# Patient Record
Sex: MALE | Race: BLACK OR AFRICAN AMERICAN | Employment: FULL TIME
[De-identification: names, ages, dates, MRNs, and addresses within clinical notes are randomized per-mention and may not be internally consistent; named-entity substitution may affect disease eponyms.]

---

## 2019-09-27 ENCOUNTER — HEALTH MAINTENANCE LETTER (OUTPATIENT)
Age: 44
End: 2019-09-27

## 2019-10-26 ENCOUNTER — HEALTH MAINTENANCE LETTER (OUTPATIENT)
Age: 44
End: 2019-10-26

## 2021-01-09 ENCOUNTER — HEALTH MAINTENANCE LETTER (OUTPATIENT)
Age: 46
End: 2021-01-09

## 2021-10-23 ENCOUNTER — HEALTH MAINTENANCE LETTER (OUTPATIENT)
Age: 46
End: 2021-10-23

## 2022-02-12 ENCOUNTER — HEALTH MAINTENANCE LETTER (OUTPATIENT)
Age: 47
End: 2022-02-12

## 2022-10-09 ENCOUNTER — HEALTH MAINTENANCE LETTER (OUTPATIENT)
Age: 47
End: 2022-10-09

## 2023-03-25 ENCOUNTER — HEALTH MAINTENANCE LETTER (OUTPATIENT)
Age: 48
End: 2023-03-25

## 2025-07-21 ENCOUNTER — OFFICE VISIT (OUTPATIENT)
Dept: FAMILY MEDICINE | Facility: CLINIC | Age: 50
End: 2025-07-21
Payer: MEDICAID

## 2025-07-21 VITALS
DIASTOLIC BLOOD PRESSURE: 104 MMHG | OXYGEN SATURATION: 96 % | HEIGHT: 74 IN | TEMPERATURE: 97.5 F | BODY MASS INDEX: 40.43 KG/M2 | WEIGHT: 315 LBS | RESPIRATION RATE: 18 BRPM | SYSTOLIC BLOOD PRESSURE: 144 MMHG | HEART RATE: 73 BPM

## 2025-07-21 DIAGNOSIS — E89.6 H/O TOTAL ADRENALECTOMY: ICD-10-CM

## 2025-07-21 DIAGNOSIS — Z78.9 MEDICALLY COMPLEX PATIENT: ICD-10-CM

## 2025-07-21 DIAGNOSIS — E78.5 HYPERLIPIDEMIA WITH TARGET LDL LESS THAN 100: ICD-10-CM

## 2025-07-21 DIAGNOSIS — E26.9 ALDOSTERONISM: ICD-10-CM

## 2025-07-21 DIAGNOSIS — Z86.79 HISTORY OF CORONARY ARTERY DISEASE: ICD-10-CM

## 2025-07-21 DIAGNOSIS — Z90.3 H/O GASTRIC SLEEVE: ICD-10-CM

## 2025-07-21 DIAGNOSIS — Z13.1 SCREENING FOR DIABETES MELLITUS: ICD-10-CM

## 2025-07-21 DIAGNOSIS — I10 PRIMARY HYPERTENSION: ICD-10-CM

## 2025-07-21 DIAGNOSIS — Z11.59 NEED FOR HEPATITIS C SCREENING TEST: ICD-10-CM

## 2025-07-21 DIAGNOSIS — Z11.4 SCREENING FOR HIV (HUMAN IMMUNODEFICIENCY VIRUS): ICD-10-CM

## 2025-07-21 DIAGNOSIS — N18.31 STAGE 3A CHRONIC KIDNEY DISEASE (H): ICD-10-CM

## 2025-07-21 DIAGNOSIS — I25.10 CORONARY ARTERY DISEASE DUE TO LIPID RICH PLAQUE: ICD-10-CM

## 2025-07-21 DIAGNOSIS — Z00.00 ANNUAL WELLNESS VISIT: ICD-10-CM

## 2025-07-21 DIAGNOSIS — Z12.11 SCREEN FOR COLON CANCER: ICD-10-CM

## 2025-07-21 DIAGNOSIS — Z13.6 SCREENING FOR CARDIOVASCULAR CONDITION: ICD-10-CM

## 2025-07-21 DIAGNOSIS — Z76.89 ENCOUNTER TO ESTABLISH CARE: Primary | ICD-10-CM

## 2025-07-21 DIAGNOSIS — E66.813 CLASS 3 SEVERE OBESITY WITH SERIOUS COMORBIDITY AND BODY MASS INDEX (BMI) OF 40.0 TO 44.9 IN ADULT, UNSPECIFIED OBESITY TYPE (H): ICD-10-CM

## 2025-07-21 DIAGNOSIS — I25.83 CORONARY ARTERY DISEASE DUE TO LIPID RICH PLAQUE: ICD-10-CM

## 2025-07-21 LAB
EST. AVERAGE GLUCOSE BLD GHB EST-MCNC: 117 MG/DL
HBA1C MFR BLD: 5.7 % (ref 0–5.6)

## 2025-07-21 PROCEDURE — 99214 OFFICE O/P EST MOD 30 MIN: CPT | Mod: 25 | Performed by: INTERNAL MEDICINE

## 2025-07-21 PROCEDURE — 36415 COLL VENOUS BLD VENIPUNCTURE: CPT | Performed by: INTERNAL MEDICINE

## 2025-07-21 PROCEDURE — 99000 SPECIMEN HANDLING OFFICE-LAB: CPT | Performed by: INTERNAL MEDICINE

## 2025-07-21 PROCEDURE — 83036 HEMOGLOBIN GLYCOSYLATED A1C: CPT | Performed by: INTERNAL MEDICINE

## 2025-07-21 PROCEDURE — 3080F DIAST BP >= 90 MM HG: CPT | Performed by: INTERNAL MEDICINE

## 2025-07-21 PROCEDURE — 3044F HG A1C LEVEL LT 7.0%: CPT | Performed by: INTERNAL MEDICINE

## 2025-07-21 PROCEDURE — 93000 ELECTROCARDIOGRAM COMPLETE: CPT | Performed by: INTERNAL MEDICINE

## 2025-07-21 PROCEDURE — 3050F LDL-C >= 130 MG/DL: CPT | Performed by: INTERNAL MEDICINE

## 2025-07-21 PROCEDURE — 80061 LIPID PANEL: CPT | Performed by: INTERNAL MEDICINE

## 2025-07-21 PROCEDURE — 86803 HEPATITIS C AB TEST: CPT | Performed by: INTERNAL MEDICINE

## 2025-07-21 PROCEDURE — 82088 ASSAY OF ALDOSTERONE: CPT | Performed by: INTERNAL MEDICINE

## 2025-07-21 PROCEDURE — 84244 ASSAY OF RENIN: CPT | Mod: 90 | Performed by: INTERNAL MEDICINE

## 2025-07-21 PROCEDURE — 99386 PREV VISIT NEW AGE 40-64: CPT | Performed by: INTERNAL MEDICINE

## 2025-07-21 PROCEDURE — 87389 HIV-1 AG W/HIV-1&-2 AB AG IA: CPT | Performed by: INTERNAL MEDICINE

## 2025-07-21 PROCEDURE — 82570 ASSAY OF URINE CREATININE: CPT | Performed by: INTERNAL MEDICINE

## 2025-07-21 PROCEDURE — 82043 UR ALBUMIN QUANTITATIVE: CPT | Performed by: INTERNAL MEDICINE

## 2025-07-21 PROCEDURE — 3075F SYST BP GE 130 - 139MM HG: CPT | Performed by: INTERNAL MEDICINE

## 2025-07-21 RX ORDER — METOPROLOL SUCCINATE 25 MG/1
50 TABLET, EXTENDED RELEASE ORAL DAILY
Qty: 180 TABLET | Refills: 0 | Status: SHIPPED | OUTPATIENT
Start: 2025-07-21

## 2025-07-21 RX ORDER — FELODIPINE 10 MG/1
10 TABLET, EXTENDED RELEASE ORAL DAILY
COMMUNITY
End: 2025-07-21

## 2025-07-21 RX ORDER — ROSUVASTATIN CALCIUM 40 MG/1
40 TABLET, COATED ORAL DAILY
Qty: 90 TABLET | Refills: 1 | Status: SHIPPED | OUTPATIENT
Start: 2025-07-21

## 2025-07-21 RX ORDER — FELODIPINE 10 MG/1
10 TABLET, EXTENDED RELEASE ORAL DAILY
Qty: 90 TABLET | Refills: 0 | Status: SHIPPED | OUTPATIENT
Start: 2025-07-21

## 2025-07-21 RX ORDER — METOPROLOL TARTRATE 75 MG/1
TABLET ORAL
COMMUNITY

## 2025-07-21 RX ORDER — HYDROCHLOROTHIAZIDE 12.5 MG/1
12.5 TABLET ORAL DAILY
Qty: 90 TABLET | Refills: 0 | Status: SHIPPED | OUTPATIENT
Start: 2025-07-21

## 2025-07-21 RX ORDER — METOPROLOL SUCCINATE 25 MG/1
25 TABLET, EXTENDED RELEASE ORAL 3 TIMES DAILY
COMMUNITY
End: 2025-07-21

## 2025-07-21 RX ORDER — LOSARTAN POTASSIUM 25 MG/1
25 TABLET ORAL DAILY
Qty: 90 TABLET | Refills: 0 | Status: SHIPPED | OUTPATIENT
Start: 2025-07-21

## 2025-07-21 SDOH — HEALTH STABILITY: PHYSICAL HEALTH: ON AVERAGE, HOW MANY DAYS PER WEEK DO YOU ENGAGE IN MODERATE TO STRENUOUS EXERCISE (LIKE A BRISK WALK)?: 2 DAYS

## 2025-07-21 ASSESSMENT — SOCIAL DETERMINANTS OF HEALTH (SDOH): HOW OFTEN DO YOU GET TOGETHER WITH FRIENDS OR RELATIVES?: ONCE A WEEK

## 2025-07-21 NOTE — PROGRESS NOTES
Preventive Care Visit  [facility]  Ward Kumar MD, Internal Medicine  Jul 21, 2025      Assessment & Plan   Problem List Items Addressed This Visit       CAD (coronary artery disease)    Relevant Orders    EKG 12-lead complete w/read - Clinics (Completed)    CKD (chronic kidney disease) stage 3, GFR 30-59 ml/min (H)    Relevant Orders    Albumin Random Urine Quantitative with Creat Ratio (Completed)    Adult Nephrology  Referral    Hyperlipidemia with target LDL less than 100    Relevant Medications    rosuvastatin (CRESTOR) 40 MG tablet    HTN (hypertension)    Relevant Medications    felodipine ER (PLENDIL) 10 MG 24 hr tablet    metoprolol succinate ER (TOPROL XL) 25 MG 24 hr tablet    hydroCHLOROthiazide 12.5 MG tablet    losartan (COZAAR) 25 MG tablet    Other Relevant Orders    Albumin Random Urine Quantitative with Creat Ratio (Completed)    Adult Cardiology Eval  Referral    Hyperaldosteronism     Other Visit Diagnoses         Encounter to establish care    -  Primary      Annual wellness visit          Screening for cardiovascular condition          Screening for diabetes mellitus        Relevant Orders    Hemoglobin A1c (Completed)      Screen for colon cancer        Relevant Orders    Colonoscopy Screening  Referral      Screening for HIV (human immunodeficiency virus)        Relevant Orders    HIV Antigen Antibody Combo (Completed)      Need for hepatitis C screening test        Relevant Orders    Hepatitis C Screen Reflex to HCV RNA Quant and Genotype (Completed)      History of coronary artery disease        Relevant Medications    metoprolol succinate ER (TOPROL XL) 25 MG 24 hr tablet    Other Relevant Orders    Lipid panel reflex to direct LDL Non-fasting (Completed)    Echocardiogram Complete      H/O total adrenalectomy        Relevant Orders    Aldosterone    Renin activity    Aldosterone Renin Ratio      H/O gastric sleeve          Class 3 severe  obesity with serious comorbidity and body mass index (BMI) of 40.0 to 44.9 in adult, unspecified obesity type (H)        Relevant Orders    Adult Comprehensive Weight Management  Referral      Medically complex patient        Relevant Orders    Med Therapy Management Referral           Assessment & Plan  Encounter to establish care:  - Establish care with a heart specialist and a kidney specialist.    Screening for cardiovascular condition:  - Coronary artery disease due to lipid-rich plaque.  - Order an echocardiogram to assess heart function.    Hyperlipidemia with target LDL less than 100:  - Continue Crestor for cholesterol management.    Screen for colon cancer:  - Order a colonoscopy.    Screening for HIV (human immunodeficiency virus):  - Conduct HIV screening as recommended by CDC.    Need for hepatitis C screening test:  - Conduct hepatitis C screening as recommended by CDC.    Aldosteronism:  - History of adrenalectomy for hyperaldosteronism.  - Check aldosterone levels.    Primary hypertension:  - Blood pressure is elevated.  - Prescribe metoprolol, hydrochlorothiazide, losartan, and felodipine. Monitor blood pressure and heart rate. Adjust metoprolol dosage based on heart rate.  - Risks and side effects: Monitor for dizziness or lightheadedness with metoprolol.    Coronary artery disease due to lipid rich plaque:  - History of coronary artery disease with stent placement.  - Continue Crestor. Establish care with a heart specialist.    H/O total adrenalectomy:  - History of right adrenalectomy due to hyperaldosteronism.    Stage 3a chronic kidney disease:  - GFR of 45, indicating stage 3a chronic kidney disease.  - Monitor kidney function with labs. Prescribe low-dose losartan.    H/O gastric sleeve:  - Consider referral to weight management.    Class 3 severe obesity with serious comorbidity and body mass index (BMI) of 40.0 to 44.9 in adult, unspecified obesity type:  - Consider referral to  weight management.    Medically complex patient:  - Coordinate care with specialists and pharmacist.     Assessment & Plan  Encounter to establish care:  Establish care with heart specialists and kidney specialists. Referral to pharmacist for medication management.    Screening for cardiovascular condition:  Coronary artery disease due to lipid-rich plaque.  Referral for echocardiogram and follow-up with heart specialist. Monitor heart rate and blood pressure regularly.    Hyperlipidemia with target LDL less than 100:  History of coronary artery disease.  Continue Crestor for cholesterol management to prevent plaque buildup.    Screen for colon cancer:  Order for colonoscopy after age 45.    Screening for HIV (human immunodeficiency virus):  Consent given for HIV screening.    Need for hepatitis C screening test:  Consent given for hepatitis C screening.    Aldosteronism:  History of adrenalectomy due to hyperaldosteronism.  Check aldosterone levels. Monitor kidney function.    Primary hypertension:  Long-standing hypertension since 2002.  Prescribe metoprolol, hydrochlorothiazide, losartan, and felodipine. Monitor heart rate and blood pressure. Adjust medication based on heart rate readings.  Risks and side effects: metoprolol can cause dizziness and low heart rate if taken in excess.    Coronary artery disease due to lipid rich plaque:  History of coronary artery disease with stent placement.  Referral to heart specialist for echocardiogram and cardiac evaluation.    H/O total adrenalectomy:  History of right adrenalectomy due to hyperaldosteronism.  Monitor aldosterone levels and kidney function.    Stage 3a chronic kidney disease (H):  Early stage chronic kidney disease with GFR of 45.  Monitor kidney function through labs. Prescribe low dose losartan to protect kidneys.    H/O gastric sleeve:  History of gastric sleeve surgery in 2018.  Referral to weight management program.    Class 3 severe obesity with  "serious comorbidity and body mass index (BMI) of 40.0 to 44.9 in adult, unspecified obesity type (H):  Referral to weight management program.     Patient has been advised of split billing requirements and indicates understanding: Yes    BMI  Estimated body mass index is 40.59 kg/m  as calculated from the following:    Height as of this encounter: 1.887 m (6' 2.29\").    Weight as of this encounter: 144.5 kg (318 lb 9.6 oz).   Weight management plan: Discussed healthy diet and exercise guidelines    Counseling  Appropriate preventive services were addressed with this patient via screening, questionnaire, or discussion as appropriate for fall prevention, nutrition, physical activity, Tobacco-use cessation, social engagement, weight loss and cognition.  Checklist reviewing preventive services available has been given to the patient.  Reviewed patient's diet, addressing concerns and/or questions.   He is at risk for lack of exercise and has been provided with information to increase physical activity for the benefit of his well-being.   The patient was instructed to see the dentist every 6 months.   Reviewed preventive health counseling, as reflected in patient instructions       Regular exercise       Healthy diet/nutrition       Vision screening       Hearing screening       Immunizations  Appropriate vaccinations were ordered.           Alcohol Use        Family planning       Safe sex practices/STD prevention       Hep C screening for all patients one time for ages 18-79 years       HIV screeninx in teen years, 1x in adult years, and at intervals if high risk       Colorectal cancer screening       Consider prostate cancer screening (age 55-69)       Consider lung cancer screening for ages 55-80 years (77 for Medicare) and 20 pack-year smoking history        Advance Care Planning        Pati Fuentes is a 49 year old, presenting for the following:  Physical and Establish Care            2025     1:59 " "PM   Additional Questions   Roomed by BLAYNE Cruz   Accompanied by self          HPI  Samuelsvetlana CHRISTOFER Delvalle, 49 years    Hypertension  History of high blood pressure since 2002. Previously managed with metoprolol and felodipine (referred to as \"flamlodipine\" by patient). Patient reports running out of metoprolol after moving from [place] at the beginning of June 2025 and has not taken it for approximately 2-3 months. No current use of other blood pressure medications since the move. No reported chest pain or symptoms with exertion. No reported swelling in legs except when sitting or standing for a long time. No tingling or numbness in arms or legs. No musculoskeletal deformities or joint pain reported.    Coronary Artery Disease  History of heart disease with stent placement in the \" maker\" artery, year not specified but referenced as \"202\". No repeat stents since initial procedure. No chest pain with exertion reported. Patient previously took baby aspirin and Crestor for cholesterol management.    Chronic Kidney Disease  Patient reports being told of kidney issues, attributed to long-term medication use. GFR previously noted as 45. No history of gout flares recently; past flares occurred in hand, ankle, and toe. No current use of hydrochlorothiazide or losartan prior to this visit.    Adrenal Disease/Hyperaldosteronism  History of hyperaldosteronism with right adrenalectomy in 2012 due to a hyperfunctioning adrenal tumor. Left adrenal gland noted to have nodules but was not removed. CT abdomen performed in 2011 showed mild left hydronephrosis (4 mm) due to a lower left kidney stone, stable mild contour deformity of pancreatic tail, and interval right adrenalectomy since 2012. No history of cancer associated with adrenal tumor.    Obesity/Weight History  History of gastric sleeve surgery in 2018. Initial weight loss reported, but subsequent weight gain during COVID-19 pandemic; currently 18 lbs above target " "weight.    Family History of Cancer and Cardiometabolic Disease  Mother had breast cancer two years ago, now has recurrent cancer with fluid in lungs described as cancerous; maternal grandmother and great-grandmother also  of breast cancer in the 1970s. Sister diagnosed with breast cancer two weeks prior to visit. Father  from stroke. One brother and one sister, both have type 2 diabetes; brother does not have hypertension per patient report.    Medication Allergies  History of hiccups with lisinopril use.    Miscellaneous  No sleep apnea reported; described as a quiet sleeper without snoring or waking up fatigued. Vision requires readers; recent hearing test was above average for age, no ringing in ears reported. No concerning moles or skin lesions noted by patient; presence of skin tags and small raised bumps around eyes for a long time. No scrotal masses or hernias reported. No depression or anxiety symptoms reported by patient. Has three daughters aged 31, 27, and 14 years old.    History of Present Illness-  Gina Delvalle, 49 years    Hypertension  - History of high blood pressure since   - Previously taking metoprolol and felodipine (referred to as \"flamlodipine\")  - Has not taken metoprolol or other blood pressure medications since moving in early 2025  - Previously took metoprolol 75 mg once daily (three 25 mg tablets at once), found higher doses intolerable  - Blood pressure medications last taken in [place] prior to 2025  - No reported chest pain or symptoms with exertion  - No reported swelling in legs except after prolonged sitting or standing  - No tingling or numbness in arms or legs    Coronary artery disease  - History of heart disease with stent placement in the \" maker\" artery in   - No repeat stents since initial procedure  - No chest pain with exertion reported  - Not currently taking metoprolol due to running out of medication after move in 2025  - Not " "currently taking Crestor but has taken it previously for cholesterol management  - Takes baby aspirin    Chronic kidney disease  - Told has kidney issues, attributed to long-term medication use  - GFR previously reported as 45  - Previously followed by a kidney doctor in [place]    Gout  - History of gout, with past flares affecting hand, ankle, and toe  - No recent flares reported    Hyperaldosteronism/adrenal tumor history  - History of hyperaldosteronism with right adrenalectomy performed in  for adrenal tumor (hyperfunctioning, not cancer per patient)  - Left adrenal gland has nodules but was not removed; imaging performed in  showed mild left hydronephrosis and small left kidney stone    Obesity/weight management  - Underwent gastric sleeve surgery in 2018 for weight loss  - Reports regaining weight after COVID pandemic, currently 18 lbs overweight compared to goal    Family history of cancer and stroke  - Mother had breast cancer two years ago, now has recurrent cancer with malignant pleural effusion (fluid in lungs)  - Sister diagnosed with breast cancer two weeks ago (early 2025)  - Maternal grandmother  of breast cancer in the 1970s  - Father  of stroke    Family history of diabetes and hypertension  - Brother and sister both have type 2 diabetes; brother does not have hypertension per patient report    Medication intolerance/allergy  - Developed severe hiccups with lisinopril use    Miscellaneous  - Reports vision requires reading glasses; hearing above average for age per recent test three months ago; no tinnitus reported     History of Present Illness-  Gina Delvalle, 49 years    Hypertension  History of high blood pressure since . Previously managed with metoprolol and felodipine (referred to as \"flamlodipine\" by patient). Patient reports running out of metoprolol after moving from [place] at the beginning of 2025 and has not taken it for approximately 2-3 months. No " "current use of other blood pressure medications since the move. No reported chest pain or symptoms with exertion. No reported swelling in legs except when sitting or standing for a long time. No tingling or numbness in arms or legs. No musculoskeletal deformities or joint pain reported.    Coronary Artery Disease  History of heart disease with stent placement in the \" maker\" artery, year not specified but referenced as \"202\". No repeat stents since initial procedure. No chest pain with exertion reported. Patient previously took baby aspirin and Crestor for cholesterol management.    Chronic Kidney Disease  Patient reports being told of kidney issues, attributed to long-term medication use. GFR previously noted as 45. No history of gout flares recently; past flares occurred in hand, ankle, and toe. No current use of hydrochlorothiazide or losartan prior to this visit.    Adrenal Disease/Hyperaldosteronism  History of hyperaldosteronism with right adrenalectomy in  due to a hyperfunctioning adrenal tumor. Left adrenal gland noted to have nodules but was not removed. CT abdomen performed in  showed mild left hydronephrosis (4 mm) due to a lower left kidney stone, stable mild contour deformity of pancreatic tail, and interval right adrenalectomy since . No history of cancer associated with adrenal tumor.    Obesity/Weight History  History of gastric sleeve surgery in 2018. Initial weight loss reported, but subsequent weight gain during COVID-19 pandemic; currently 18 lbs above target weight.    Family History of Cancer and Cardiometabolic Disease  Mother had breast cancer two years ago, now has recurrent cancer with fluid in lungs described as cancerous; maternal grandmother and great-grandmother also  of breast cancer in the 1970s. Sister diagnosed with breast cancer two weeks prior to visit. Father  from stroke. One brother and one sister, both have type 2 diabetes; brother does not have " hypertension per patient report.    Medication Allergies  History of hiccups with lisinopril use.    Miscellaneous  No sleep apnea reported; described as a quiet sleeper without snoring or waking up fatigued. Vision requires readers; recent hearing test was above average for age, no ringing in ears reported. No concerning moles or skin lesions noted by patient; presence of skin tags and small raised bumps around eyes for a long time. No scrotal masses or hernias reported. No depression or anxiety symptoms reported by patient. Has three daughters aged 31, 27, and 14 years old.     Advance Care Planning    Discussed advance care planning with patient; informed AVS has link to [employer].        7/21/2025   General Health   How would you rate your overall physical health? Good   Feel stress (tense, anxious, or unable to sleep) Only a little   (!) STRESS CONCERN      7/21/2025   Nutrition   Three or more servings of calcium each day? Yes   Diet: Low salt   How many servings of fruit and vegetables per day? (!) 2-3   How many sweetened beverages each day? 0-1         7/21/2025   Exercise   Days per week of moderate/strenous exercise 2 days   (!) EXERCISE CONCERN      7/21/2025   Social Factors   Frequency of gathering with friends or relatives Once a week   Worry food won't last until get money to buy more No   Food not last or not have enough money for food? No   Do you have housing? (Housing is defined as stable permanent housing and does not include staying outside in a car, in a tent, in an abandoned building, in an overnight shelter, or couch-surfing.) Yes   Are you worried about losing your housing? No   Lack of transportation? No   Unable to get utilities (heat,electricity)? No         7/21/2025   Fall Risk   Fallen 2 or more times in the past year? No   Trouble with walking or balance? No          7/21/2025   Dental   Dentist two times every year? (!) NO         Today's PHQ-2 Score:       7/21/2025     1:50  PM   PHQ-2 ( 1999 [employer])   Q1: Little interest or pleasure in doing things 0   Q2: Feeling down, depressed or hopeless 0   PHQ-2 Score 0    Q1: Little interest or pleasure in doing things Not at all   Q2: Feeling down, depressed or hopeless Not at all   PHQ-2 Score 0       Patient-reported           7/21/2025   Substance Use   Alcohol more than 3/day or more than 7/wk No   Do you use any other substances recreationally? No     Social History     Tobacco Use    Smoking status: Never    Smokeless tobacco: Never   Substance Use Topics    Alcohol use: No    Drug use: No             7/21/2025   One time HIV Screening   Previous HIV test? No         7/21/2025   STI Screening   New sexual partner(s) since last STI/HIV test? No   ASCVD Risk   The 10-year ASCVD risk score (Marce ALLAN, et al., 2019) is: 12.3%    Values used to calculate the score:      Age: 49 years      Sex: Male      Is Non- : Yes      Diabetic: No      Tobacco smoker: No      Systolic Blood Pressure: 144 mmHg      Is BP treated: Yes      HDL Cholesterol: 50 mg/dL      Total Cholesterol: 262 mg/dL           Reviewed and updated as needed this visit by Provider    Allergies   Problems    Fam Hx            Past Medical History:   Diagnosis Date    Adrenal adenoma     CAD (coronary artery disease) 2002    s/p LAD stent     CKD (chronic kidney disease) stage 3, GFR 30-59 ml/min (H)     HTN (hypertension), benign     Hyperaldosteronism     Hyperlipidemia LDL goal < 100     Hypokalemia      Past Surgical History:   Procedure Laterality Date    adrenal surgery      ANGIOGRAM  8/28/09    no occlusion,     ANGIOPLASTY  2002    PTCA, stent LAD after MI    IR MISCELLANEOUS PROCEDURE  2/1/2012    IR MISCELLANEOUS PROCEDURE  2/1/2012    IR MISCELLANEOUS PROCEDURE  2/1/2012    LAPAROSCOPIC ADRENALECTOMY  2/28/2012    Procedure:LAPAROSCOPIC ADRENALECTOMY; LAPAROSCOPIC Right ADRENALECTOMY; Surgeon:VALENTINA KRISHNAMURTHY; Location:[facility] OR      Lab work is in process  Labs reviewed in EPIC  BP Readings from Last 3 Encounters:   07/21/25 (!) 144/104   07/16/13 (!) 148/99   03/12/13 134/82    Wt Readings from Last 3 Encounters:   07/21/25 (!) 144.5 kg (318 lb 9.6 oz)   07/16/13 (!) 167.8 kg (370 lb)   03/12/13 (!) 166.3 kg (366 lb 11.2 oz)                  Patient Active Problem List   Diagnosis    CAD (coronary artery disease)    Hypokalemia    CKD (chronic kidney disease) stage 3, GFR 30-59 ml/min (H)    Hyperlipidemia with target LDL less than 100    HTN (hypertension)    Adrenal adenoma    Hyperaldosteronism    ARF (acute renal failure)    Microcytic anemia- Hgb C     Past Surgical History:   Procedure Laterality Date    adrenal surgery      ANGIOGRAM  8/28/09    no occlusion,     ANGIOPLASTY  2002    PTCA, stent LAD after MI    IR MISCELLANEOUS PROCEDURE  2/1/2012    IR MISCELLANEOUS PROCEDURE  2/1/2012    IR MISCELLANEOUS PROCEDURE  2/1/2012    LAPAROSCOPIC ADRENALECTOMY  2/28/2012    Procedure:LAPAROSCOPIC ADRENALECTOMY; LAPAROSCOPIC Right ADRENALECTOMY; Surgeon:VALENTINA KRISHNAMURTHY; Location: OR       Social History     Tobacco Use    Smoking status: Never    Smokeless tobacco: Never   Substance Use Topics    Alcohol use: No     Family History   Problem Relation Age of Onset    Diabetes Mother     Breast Cancer Mother     Lung Cancer Mother     Alcohol/Drug Father     Cerebrovascular Disease Father     Breast Cancer Sister     Diabetes Type 2  Sister     Family History Negative Sister     Diabetes Type 2  Brother     Breast Cancer Maternal Grandmother     C.A.D. No family hx of          Current Outpatient Medications   Medication Sig Dispense Refill    aspirin 81 MG tablet Take 1 tablet by mouth daily. 90 tablet 3    ezetimibe (ZETIA) 10 MG tablet Take 1 tablet by mouth daily. 90 tablet 1    felodipine ER (PLENDIL) 10 MG 24 hr tablet Take 1 tablet (10 mg) by mouth daily. 90 tablet 0    hydroCHLOROthiazide 12.5 MG tablet Take 1 tablet (12.5  "mg) by mouth daily. 90 tablet 0    losartan (COZAAR) 100 MG tablet Take 1 tablet (100 mg) by mouth daily 90 tablet 0    losartan (COZAAR) 25 MG tablet Take 1 tablet (25 mg) by mouth daily. 90 tablet 0    metoprolol succinate ER (TOPROL XL) 25 MG 24 hr tablet Take 2 tablets (50 mg) by mouth daily. Hold if heart rate <55 bpm or dizzy 180 tablet 0    Metoprolol Tartrate 75 MG TABS Take by mouth.      rosuvastatin (CRESTOR) 40 MG tablet Take 1 tablet (40 mg) by mouth daily. 90 tablet 1    rosuvastatin (CRESTOR) 40 MG tablet Take 1 tablet by mouth daily. 90 tablet 1    metoprolol (LOPRESSOR) 100 MG tablet Take 1 tablet (100 mg) by mouth 2 times daily (Patient not taking: Reported on 7/21/2025) 180 tablet 0     Allergies   Allergen Reactions    Lisinopril Headache and Other (See Comments)     Hiccoughs.     Recent Labs   Lab Test 07/21/25  1605   A1C 5.7*   *   HDL 50   TRIG 115      ResultsCT of the abdomen from 2011 showed mild left hydronephrosis, a 4 mm obstructing lower left kidney stone, and stable mild contour deformity of the pancreatic tail of uncertain clinical significance. Interval right adrenalectomy since 2012. Normal appearing left adrenal gland.    ResultsCT of the abdomen from 2011 showed mild left hydronephrosis, a 4 mm obstructing lower left kidney stone, and stable mild contour deformity of the pancreatic tail of uncertain clinical significance. Interval right adrenalectomy since 2012. Normal appearing left adrenal gland.     Review of Systems  Constitutional, HEENT, cardiovascular, pulmonary, gi and gu systems are negative, except as otherwise noted.     Objective    Exam  BP (!) 144/104   Pulse 73   Temp 97.5  F (36.4  C) (Temporal)   Resp 18   Ht 1.887 m (6' 2.29\")   Wt (!) 144.5 kg (318 lb 9.6 oz)   SpO2 96%   BMI 40.59 kg/m     Estimated body mass index is 40.59 kg/m  as calculated from the following:    Height as of this encounter: 1.887 m (6' 2.29\").    Weight as of this encounter: " 144.5 kg (318 lb 9.6 oz).    Physical Exam  GENERAL: alert and no distress  EYES: Eyes grossly normal to inspection, PERRL and conjunctivae and sclerae normal  HENT: ear canals and TM's normal, nose and mouth without ulcers or lesions  NECK: no adenopathy, no asymmetry, masses, or scars  RESP: lungs clear to auscultation - no rales, rhonchi or wheezes  CV: regular rate and rhythm, normal S1 S2, no S3 or S4, no murmur, click or rub, no peripheral edema  ABDOMEN: soft, nontender, no hepatosplenomegaly, no masses and bowel sounds normal  MS: no gross musculoskeletal defects noted, no edema  SKIN: no suspicious lesions or rashes  NEURO: Normal strength and tone, mentation intact and speech normal  PSYCH: mentation appears normal, affect normal/bright    Physical Exam  - HEENT: Bilateral pupils equal, round, and reactive to light. Adequate oral hygiene. No oropharyngeal abnormalities. No cervical lymphadenopathy. Hypopigmented macule on the back, 2 by 1 cm.  - CARDIOVASCULAR: Heart rate 76 beats per minute. Heart rate and rhythm normal. No murmur.  - ABDOMEN: Soft, obese, not tender.  No right upper quadrant tenderness  - MUSCULOSKELETAL: No musculoskeletal abnormalities. No knee or hip abnormalities. No edema in lower extremities.  - SKIN: Acrochordons and papules over the periorbital area. No significant moles on the back.  - NEUROLOGIC: Reflexes normal bilaterally. No tremor.  - GENITOURINARY: No scrotal abnormalities. No inguinal hernias.  - EXTREMITIES: No edema in lower extremities.     Physical Exam  HEENT: Bilateral pupils equal, round, and reactive to light. Good oral hygiene. No oropharyngeal pathology. No neck masses. Hypopigmented macule on the back, 2 by 1 cm. Acrochordons and papules over the periorbital area.  CARDIOVASCULAR: Heart rate 76 beats per minute. Heart rate and rhythm regular, no murmur.  ABDOMEN: Soft, obese, nontender.  MUSCULOSKELETAL: No musculoskeletal abnormalities.  SKIN: Hypopigmented  macule on the back, 2 by 1 cm. Acrochordons and papules over the periorbital area.  NEUROLOGIC: Reflexes normal bilaterally. No tremor.  GENITOURINARY: No scrotal masses or lumps. No hernias.  EXTREMITIES: No edema in lower extremities.           Signed Electronically by: Ward Kumar MD

## 2025-07-21 NOTE — PATIENT INSTRUCTIONS
Based on our discussion, I have outlined the following instructions for you:      - Make an appointment with a heart doctor and a kidney doctor to start your care with them.  - Schedule a test called an echocardiogram to check how well your heart is working.  - Keep taking Crestor to help manage your cholesterol levels.  - Arrange to have a colonoscopy to check for colon cancer.  - Get tested for HIV as recommended by the Centers for Disease Control and Prevention.  - Get tested for hepatitis C as recommended by the Centers for Disease Control and Prevention.  - Have your aldosterone levels checked.  - Take the prescribed medications: metoprolol, hydrochlorothiazide, losartan, and felodipine. Keep an eye on your blood pressure and heart rate. The dose of metoprolol may need to be adjusted based on your heart rate.  - Be aware of any feelings of dizziness or lightheadedness while taking metoprolol.  - Keep taking Crestor and make sure to see a heart doctor for ongoing care.  - Have regular lab tests to monitor your kidney function. Take the prescribed low-dose losartan.  - Think about getting a referral to a weight management program.  - Work together with your specialists and pharmacist to coordinate your care.    Thank you again for your visit, and we look forward to supporting you in your journey to better health.

## 2025-07-22 ENCOUNTER — TELEPHONE (OUTPATIENT)
Dept: NEPHROLOGY | Facility: CLINIC | Age: 50
End: 2025-07-22
Payer: MEDICAID

## 2025-07-22 ENCOUNTER — PATIENT OUTREACH (OUTPATIENT)
Dept: CARE COORDINATION | Facility: CLINIC | Age: 50
End: 2025-07-22
Payer: MEDICAID

## 2025-07-22 LAB
ALDOST SERPL-MCNC: 12.1 NG/DL (ref 0–31)
CHOLEST SERPL-MCNC: 262 MG/DL
CREAT UR-MCNC: 172 MG/DL
FASTING STATUS PATIENT QL REPORTED: NO
HCV AB SERPL QL IA: NONREACTIVE
HDLC SERPL-MCNC: 50 MG/DL
HIV 1+2 AB+HIV1 P24 AG SERPL QL IA: NONREACTIVE
LDLC SERPL CALC-MCNC: 189 MG/DL
MICROALBUMIN UR-MCNC: 25.9 MG/L
MICROALBUMIN/CREAT UR: 15.06 MG/G CR (ref 0–17)
NONHDLC SERPL-MCNC: 212 MG/DL
TRIGL SERPL-MCNC: 115 MG/DL

## 2025-07-22 NOTE — TELEPHONE ENCOUNTER
[facility] Call Center    Phone Message    May a detailed message be left on voicemail: yes     Reason for Call: Other: Please place lab order per [place]. Thanks.     Action Taken:      MEDICINE RENAL       Travel Screening: Not Applicable     Date of Service:

## 2025-07-24 ENCOUNTER — TELEPHONE (OUTPATIENT)
Dept: GASTROENTEROLOGY | Facility: CLINIC | Age: 50
End: 2025-07-24
Payer: MEDICAID

## 2025-07-24 LAB — RENIN PLAS-CCNC: 1.2 NG/ML/HR

## 2025-07-24 NOTE — TELEPHONE ENCOUNTER
"Endoscopy Scheduling Screen    Caller: patient    Have you had any respiratory illness or flu-like symptoms in the last 10 days?  No    Patient is ACTIVE on [employer].  Inform patient that all appointment instructions will be sent via [employer].    Review patient's insurance for any non participating payor.    Ordering/Referring Provider:   ALEXANDRO HOOD         (If ordering provider performs procedure, schedule with ordering provider unless otherwise instructed. )    BMI: Estimated body mass index is 40.59 kg/m  as calculated from the following:    Height as of 7/21/25: 1.887 m (6' 2.29\").    Weight as of 7/21/25: 144.5 kg (318 lb 9.6 oz).     Sedation Ordered  moderate sedation.   If patient BMI > 50 do not schedule in [facility].    If patient BMI > 45 do not schedule at [facility].    Are you taking methadone or Suboxone?  NO, No RN review required.    Have you been diagnosed and are being treated for severe PTSD or severe anxiety?  NO, No RN review required.    Are you taking any prescription medications for pain 3 or more times per week?   NO, No RN review required.    Do you have a history of malignant hyperthermia?  No    (Females) Are you currently pregnant?   No     Have you been diagnosed or told you have pulmonary hypertension?   No    Do you have an LVAD?  No    Have you been told you have moderate to severe sleep apnea?  No.    Have you been told you have COPD, asthma, or any other lung disease?  No    Has your doctor ordered any cardiac tests like echo, angiogram, stress test, ablation, or EKG, that you have not completed yet?  No    Do you  have a history of any heart conditions?  Yes     Have you had any hospitalizations  in the last year for heart related issues, for example a stent placement, heart attack, or cardiomyopathy?  No    Do you have any implantable devices in your body (pacemaker, ICD)?  Other STENT- 2002    Do you take nitroglycerine?  No    Have you ever had or are you waiting for an organ " "transplant?  No. Continue scheduling, no site restrictions.    Have you had a stroke or transient ischemic attack (TIA aka \"mini stroke\") in the last 2 years?   No.    Have you been diagnosed with or been told you have cirrhosis of the liver?   No.    Are you currently on dialysis?   No    Do you need assistance transferring?   No    BMI: Estimated body mass index is 40.59 kg/m  as calculated from the following:    Height as of 7/21/25: 1.887 m (6' 2.29\").    Weight as of 7/21/25: 144.5 kg (318 lb 9.6 oz).     Is patients BMI > 40 and scheduling location [facility]?  No    Do you take an injectable or oral medication for weight loss or diabetes (excluding insulin)?  No    Do you take the medication Naltrexone?  No    Do you take blood thinners?  No       Prep   Are you currently on dialysis or do you have chronic kidney disease?  No    Do you have a diagnosis of diabetes?  No    Do you have a diagnosis of cystic fibrosis (CF)?  No    On a regular basis do you go 3 -5 days between bowel movements?  No    BMI > 40?  Yes (Extended Prep)    Preferred Pharmacy:    [id number] - [place], [place] - [address] [place] [facility] [address]  [facility] [address]  Phone: [phone] Fax: [phone]    Final Scheduling Details     Procedure scheduled  Colonoscopy    Surgeon:  GARCIA     Date of procedure:  10/06/2025     Pre-OP / PAC:   No - Not required for this site.    Location  [facility] - Patient preference.    Sedation   Moderate Sedation - Per order.      Patient Reminders:   You will receive a call from a Nurse to review instructions and health history.  This assessment must be completed prior to your procedure.  Failure to complete the Nurse assessment may result in the procedure being cancelled.      On the day of your procedure, please designate an adult(s) who can drive you home stay with you for the next 24 hours. The medicines used in the exam will make you sleepy. You will not be able " to drive.      You cannot take public transportation, ride share services, or non-medical taxi service without a responsible caregiver.  Medical transport services are allowed with the requirement that a responsible caregiver will receive you at your destination.  We require that drivers and caregivers are confirmed prior to your procedure.

## 2025-07-29 NOTE — CONFIDENTIAL NOTE
DIAGNOSIS:  Stage 3a chronic kidney disease    DATE RECEIVED: 10.29.2025    NOTES STATUS DETAILS   OFFICE NOTE from referring provider Internal 07.21.2025 Ward Kumar MD    OFFICE NOTE from other specialist  Care Everywhere 04.05.2024 Gavin Goodrich PA-C [facility]    03.25.2024 Nicholas Louie MD [facility]    08.28.2023 Antonio Vaughan MD     MEDICATION LIST Internal / Care Everywhere    LABS     CBC Care Everywhere 04.02.2025   CMP Care Everywhere 04.02.2025    BMP Care Everywhere 04.03.2025   UA Care Everywhere 03.26.2024   URINE PROTEIN Care Everywhere 03.26.2024    RENAL PANEL Care Everywhere 03.26.2024

## 2025-08-14 ENCOUNTER — PATIENT OUTREACH (OUTPATIENT)
Dept: CARE COORDINATION | Facility: CLINIC | Age: 50
End: 2025-08-14
Payer: MEDICAID

## 2025-10-29 ENCOUNTER — PRE VISIT (OUTPATIENT)
Dept: NEPHROLOGY | Facility: CLINIC | Age: 50
End: 2025-10-29
Payer: MEDICAID